# Patient Record
Sex: MALE | Race: WHITE | Employment: FULL TIME | URBAN - NONMETROPOLITAN AREA
[De-identification: names, ages, dates, MRNs, and addresses within clinical notes are randomized per-mention and may not be internally consistent; named-entity substitution may affect disease eponyms.]

---

## 2019-05-18 ENCOUNTER — OFFICE VISIT (OUTPATIENT)
Dept: FAMILY MEDICINE CLINIC | Age: 54
End: 2019-05-18
Payer: COMMERCIAL

## 2019-05-18 VITALS
HEART RATE: 84 BPM | SYSTOLIC BLOOD PRESSURE: 142 MMHG | BODY MASS INDEX: 36.44 KG/M2 | WEIGHT: 269 LBS | HEIGHT: 72 IN | DIASTOLIC BLOOD PRESSURE: 82 MMHG | TEMPERATURE: 97.6 F | OXYGEN SATURATION: 97 %

## 2019-05-18 DIAGNOSIS — J20.9 ACUTE BRONCHITIS, UNSPECIFIED ORGANISM: Primary | ICD-10-CM

## 2019-05-18 PROCEDURE — 99213 OFFICE O/P EST LOW 20 MIN: CPT | Performed by: FAMILY MEDICINE

## 2019-05-18 PROCEDURE — 96372 THER/PROPH/DIAG INJ SC/IM: CPT | Performed by: FAMILY MEDICINE

## 2019-05-18 RX ORDER — AMOXICILLIN AND CLAVULANATE POTASSIUM 875; 125 MG/1; MG/1
1 TABLET, FILM COATED ORAL 2 TIMES DAILY
Qty: 14 TABLET | Refills: 0 | Status: SHIPPED | OUTPATIENT
Start: 2019-05-18 | End: 2019-05-25

## 2019-05-18 RX ORDER — METHYLPREDNISOLONE ACETATE 40 MG/ML
40 INJECTION, SUSPENSION INTRA-ARTICULAR; INTRALESIONAL; INTRAMUSCULAR; SOFT TISSUE ONCE
Status: COMPLETED | OUTPATIENT
Start: 2019-05-18 | End: 2019-05-18

## 2019-05-18 RX ORDER — GUAIFENESIN 600 MG/1
600 TABLET, EXTENDED RELEASE ORAL 2 TIMES DAILY
Qty: 30 TABLET | Refills: 0 | Status: SHIPPED | OUTPATIENT
Start: 2019-05-18 | End: 2019-06-02

## 2019-05-18 RX ADMIN — METHYLPREDNISOLONE ACETATE 40 MG: 40 INJECTION, SUSPENSION INTRA-ARTICULAR; INTRALESIONAL; INTRAMUSCULAR; SOFT TISSUE at 11:19

## 2019-05-18 NOTE — PROGRESS NOTES
OFFICE NOTE    19  Name: Efraín Santacruz  :1965   Sex:male   Age:53 y.o. SUBJECTIVE  Chief Complaint   Patient presents with    Generalized Body Aches     onset Thursday    Congestion    Cough       HPI   Sinus cough and cough, wheezing, cough still white, no fever      Review of Systems   H/o back surgery and chronic LBP      Current Outpatient Medications:     amoxicillin-clavulanate (AUGMENTIN) 875-125 MG per tablet, Take 1 tablet by mouth 2 times daily for 7 days, Disp: 14 tablet, Rfl: 0    guaiFENesin (MUCINEX) 600 MG extended release tablet, Take 1 tablet by mouth 2 times daily for 15 days, Disp: 30 tablet, Rfl: 0    Aspirin Buf,AjSgk-HmRpt-TyOfp, (BUFFERED ASPIRIN) 325 MG TABS, Take 650 mg by mouth daily as needed for Pain, Disp: , Rfl:     traMADol (ULTRAM) 50 MG tablet, Take 50 mg by mouth as needed for Pain, Disp: , Rfl:   No Known Allergies    Past Medical History:   Diagnosis Date    Chronic back pain 2014    Numbness     low back    Sprain of lumbar region 2014    Tingling in extremities     both legs     Past Surgical History:   Procedure Laterality Date    BACK SURGERY      BELPHAROPTOSIS REPAIR Right 14    Dr Peyman Gu right periorbital complex eye lid repair,     CHOLECYSTECTOMY      LUMBAR SPINE SURGERY       Family History   Problem Relation Age of Onset    Asthma Mother     COPD Mother     High Blood Pressure Father      Social History     Tobacco History     Smoking Status  Never Smoker    Smokeless Tobacco Use  Current User          Alcohol History     Alcohol Use Status  Yes Comment  Moderate          Drug Use     Drug Use Status  No          Sexual Activity     Sexually Active  Not Asked                OBJECTIVE  Vitals:    19 1023   BP: (!) 142/82   Pulse: 84   Temp: 97.6 °F (36.4 °C)   SpO2: 97%   Weight: 269 lb (122 kg)   Height: 6' (1.829 m)        Body mass index is 36.48 kg/m².     Patient is obese    No orders of the defined types were placed in this encounter. EXAM   Physical Exam   Constitutional: He appears well-developed and well-nourished. obese   Eyes: Conjunctivae are normal.   Neck: Normal range of motion. Neck supple. Cardiovascular: Normal rate. No murmur heard. Pulmonary/Chest: Effort normal.   Expiratory phase, rhonchi   Skin: No rash noted. Kitty Pratt was seen today for generalized body aches, congestion and cough. Diagnoses and all orders for this visit:    Acute bronchitis, unspecified organism  -     methylPREDNISolone acetate (DEPO-MEDROL) injection 40 mg  -     amoxicillin-clavulanate (AUGMENTIN) 875-125 MG per tablet; Take 1 tablet by mouth 2 times daily for 7 days  -     guaiFENesin (MUCINEX) 600 MG extended release tablet; Take 1 tablet by mouth 2 times daily for 15 days          Return if symptoms worsen or fail to improve.     Electronically signed by Spike Nichole MD on 5/18/19 at 11:02 AM

## 2020-06-03 ENCOUNTER — TELEPHONE (OUTPATIENT)
Dept: ORTHOPEDIC SURGERY | Age: 55
End: 2020-06-03

## 2022-06-10 ENCOUNTER — OFFICE VISIT (OUTPATIENT)
Dept: FAMILY MEDICINE CLINIC | Age: 57
End: 2022-06-10
Payer: MEDICAID

## 2022-06-10 VITALS
WEIGHT: 272 LBS | BODY MASS INDEX: 36.84 KG/M2 | HEART RATE: 78 BPM | OXYGEN SATURATION: 98 % | RESPIRATION RATE: 16 BRPM | HEIGHT: 72 IN | TEMPERATURE: 97.1 F

## 2022-06-10 DIAGNOSIS — H61.21 IMPACTED CERUMEN OF RIGHT EAR: ICD-10-CM

## 2022-06-10 DIAGNOSIS — H60.501 ACUTE OTITIS EXTERNA OF RIGHT EAR, UNSPECIFIED TYPE: Primary | ICD-10-CM

## 2022-06-10 PROCEDURE — 99213 OFFICE O/P EST LOW 20 MIN: CPT | Performed by: STUDENT IN AN ORGANIZED HEALTH CARE EDUCATION/TRAINING PROGRAM

## 2022-06-10 RX ORDER — CIPROFLOXACIN AND DEXAMETHASONE 3; 1 MG/ML; MG/ML
4 SUSPENSION/ DROPS AURICULAR (OTIC) 2 TIMES DAILY
Qty: 7.5 ML | Refills: 0 | Status: SHIPPED | OUTPATIENT
Start: 2022-06-10 | End: 2022-06-17

## 2022-06-10 ASSESSMENT — ENCOUNTER SYMPTOMS
COUGH: 0
RHINORRHEA: 0
VOMITING: 0
SHORTNESS OF BREATH: 0
ABDOMINAL PAIN: 0
NAUSEA: 0

## 2022-06-10 NOTE — PROGRESS NOTES
Brody Ware (:  1965) is a 49 y.o. male,Established patient, here for evaluation of the following chief complaint(s):  Otalgia (right, onset last night)         ASSESSMENT/PLAN:  1. Acute otitis externa of right ear, unspecified type  -     ciprofloxacin-dexamethasone (CIPRODEX) 0.3-0.1 % otic suspension; Place 4 drops into the right ear 2 times daily for 7 days, Disp-7.5 mL, R-0Normal  -     [facility] - Mary Newman MD, Otolaryngology, [facility]  2. Impacted cerumen of right ear  -     ciprofloxacin-dexamethasone (CIPRODEX) 0.3-0.1 % otic suspension; Place 4 drops into the right ear 2 times daily for 7 days, Disp-7.5 mL, R-0Normal  Otitis externa likely on exam. Removed a significant amount of cerumen + debris with a curette which did not relieve they patients hearing issues. It is difficult to visualize his membrane secondary to swelling and erythema. He also reports drainage. Given his hearing difficulty in the ear I would like him to follow up with ENT    Return if symptoms worsen or fail to improve. Subjective   SUBJECTIVE/OBJECTIVE:  R ear pain  -has been going on for a few months with difficulty hearing  -this morning has had some issues with pain and drainage  -Has had multiple surgeries on that ear when he was younger  -No fever      Review of Systems   Constitutional: Negative for chills and fever. HENT: Positive for ear pain and hearing loss. Negative for congestion and rhinorrhea. Respiratory: Negative for cough and shortness of breath. Cardiovascular: Negative for chest pain and leg swelling. Gastrointestinal: Negative for abdominal pain, nausea and vomiting. Genitourinary: Negative for dysuria and hematuria. Musculoskeletal: Negative for arthralgias and myalgias. Skin: Negative for rash and wound. Neurological: Negative for dizziness and light-headedness. Objective   Physical Exam  Vitals reviewed. Constitutional:       General: He is not in acute distress.   HENT: Head: Normocephalic and atraumatic. Right Ear: Decreased hearing noted. Swelling present. There is impacted cerumen. No mastoid tenderness. Tympanic membrane is erythematous. Left Ear: Tympanic membrane normal.   Eyes:      Extraocular Movements: Extraocular movements intact. Conjunctiva/sclera: Conjunctivae normal.   Cardiovascular:      Rate and Rhythm: Normal rate and regular rhythm. Pulmonary:      Effort: Pulmonary effort is normal.      Breath sounds: Normal breath sounds. No wheezing. Abdominal:      General: Abdomen is flat. There is no distension. Musculoskeletal:         General: No tenderness or deformity. Neurological:      General: No focal deficit present. Mental Status: He is alert and oriented to person, place, and time. An electronic signature was used to authenticate this note.     --Tea Lee MD

## 2022-07-13 ENCOUNTER — TELEPHONE (OUTPATIENT)
Dept: AUDIOLOGY | Age: 57
End: 2022-07-13

## 2022-07-13 NOTE — TELEPHONE ENCOUNTER
LVM for patient to call back to schedule hearing eval. Dr. Benitez Poles referral.   Electronically signed by Miko Pozo on 7/13/2022 at 3:31 PM

## 2022-08-16 ENCOUNTER — FOLLOWUP TELEPHONE ENCOUNTER (OUTPATIENT)
Dept: AUDIOLOGY | Age: 57
End: 2022-08-16

## 2022-10-10 ENCOUNTER — TELEPHONE (OUTPATIENT)
Dept: AUDIOLOGY | Age: 57
End: 2022-10-10

## 2022-10-10 NOTE — TELEPHONE ENCOUNTER
Called patient - 3rd call    His name was on  so left detailed message. [facility] calling to see if still need a hearing test ordered in June. Asked to call if wants to get on schedule and this would be our last call.

## 2022-11-04 ENCOUNTER — OFFICE VISIT (OUTPATIENT)
Dept: FAMILY MEDICINE CLINIC | Age: 57
End: 2022-11-04
Payer: COMMERCIAL

## 2022-11-04 VITALS
SYSTOLIC BLOOD PRESSURE: 130 MMHG | BODY MASS INDEX: 35.73 KG/M2 | HEART RATE: 84 BPM | DIASTOLIC BLOOD PRESSURE: 90 MMHG | OXYGEN SATURATION: 96 % | WEIGHT: 263.8 LBS | TEMPERATURE: 97.1 F | HEIGHT: 72 IN

## 2022-11-04 DIAGNOSIS — R03.0 ELEVATED BLOOD PRESSURE READING IN OFFICE WITHOUT DIAGNOSIS OF HYPERTENSION: ICD-10-CM

## 2022-11-04 DIAGNOSIS — Z23 NEED FOR IMMUNIZATION AGAINST INFLUENZA: ICD-10-CM

## 2022-11-04 DIAGNOSIS — Z13.6 ENCOUNTER FOR LIPID SCREENING FOR CARDIOVASCULAR DISEASE: ICD-10-CM

## 2022-11-04 DIAGNOSIS — Z13.220 ENCOUNTER FOR LIPID SCREENING FOR CARDIOVASCULAR DISEASE: ICD-10-CM

## 2022-11-04 DIAGNOSIS — Z12.11 ENCOUNTER FOR SCREENING FOR MALIGNANT NEOPLASM OF COLON: ICD-10-CM

## 2022-11-04 DIAGNOSIS — Z00.01 ENCOUNTER FOR WELL ADULT EXAM WITH ABNORMAL FINDINGS: Primary | ICD-10-CM

## 2022-11-04 LAB
ALBUMIN SERPL-MCNC: 4.2 G/DL (ref 3.5–5.2)
ALP BLD-CCNC: 77 U/L (ref 40–129)
ALT SERPL-CCNC: 32 U/L (ref 0–40)
ANION GAP SERPL CALCULATED.3IONS-SCNC: 15 MMOL/L (ref 7–16)
AST SERPL-CCNC: 25 U/L (ref 0–39)
BASOPHILS ABSOLUTE: 0.07 E9/L (ref 0–0.2)
BASOPHILS RELATIVE PERCENT: 0.9 % (ref 0–2)
BILIRUB SERPL-MCNC: 0.6 MG/DL (ref 0–1.2)
BUN BLDV-MCNC: 18 MG/DL (ref 6–20)
CALCIUM SERPL-MCNC: 9.2 MG/DL (ref 8.6–10.2)
CHLORIDE BLD-SCNC: 104 MMOL/L (ref 98–107)
CHOLESTEROL, TOTAL: 187 MG/DL (ref 0–199)
CO2: 20 MMOL/L (ref 22–29)
CREAT SERPL-MCNC: 1.2 MG/DL (ref 0.7–1.2)
EOSINOPHILS ABSOLUTE: 0.24 E9/L (ref 0.05–0.5)
EOSINOPHILS RELATIVE PERCENT: 3.1 % (ref 0–6)
GFR SERPL CREATININE-BSD FRML MDRD: >60 ML/MIN/1.73
GLUCOSE BLD-MCNC: 88 MG/DL (ref 74–99)
HCT VFR BLD CALC: 44.7 % (ref 37–54)
HDLC SERPL-MCNC: 56 MG/DL
HEMOGLOBIN: 15.3 G/DL (ref 12.5–16.5)
IMMATURE GRANULOCYTES #: 0.03 E9/L
IMMATURE GRANULOCYTES %: 0.4 % (ref 0–5)
LDL CHOLESTEROL CALCULATED: 108 MG/DL (ref 0–99)
LYMPHOCYTES ABSOLUTE: 2.08 E9/L (ref 1.5–4)
LYMPHOCYTES RELATIVE PERCENT: 26.5 % (ref 20–42)
MCH RBC QN AUTO: 32.6 PG (ref 26–35)
MCHC RBC AUTO-ENTMCNC: 34.2 % (ref 32–34.5)
MCV RBC AUTO: 95.3 FL (ref 80–99.9)
MONOCYTES ABSOLUTE: 0.85 E9/L (ref 0.1–0.95)
MONOCYTES RELATIVE PERCENT: 10.8 % (ref 2–12)
NEUTROPHILS ABSOLUTE: 4.57 E9/L (ref 1.8–7.3)
NEUTROPHILS RELATIVE PERCENT: 58.3 % (ref 43–80)
PDW BLD-RTO: 13.9 FL (ref 11.5–15)
PLATELET # BLD: 309 E9/L (ref 130–450)
PMV BLD AUTO: 10.4 FL (ref 7–12)
POTASSIUM SERPL-SCNC: 4.4 MMOL/L (ref 3.5–5)
RBC # BLD: 4.69 E12/L (ref 3.8–5.8)
SODIUM BLD-SCNC: 139 MMOL/L (ref 132–146)
TOTAL PROTEIN: 7.1 G/DL (ref 6.4–8.3)
TRIGL SERPL-MCNC: 113 MG/DL (ref 0–149)
VLDLC SERPL CALC-MCNC: 23 MG/DL
WBC # BLD: 7.8 E9/L (ref 4.5–11.5)

## 2022-11-04 PROCEDURE — 90471 IMMUNIZATION ADMIN: CPT | Performed by: STUDENT IN AN ORGANIZED HEALTH CARE EDUCATION/TRAINING PROGRAM

## 2022-11-04 PROCEDURE — 99396 PREV VISIT EST AGE 40-64: CPT | Performed by: STUDENT IN AN ORGANIZED HEALTH CARE EDUCATION/TRAINING PROGRAM

## 2022-11-04 PROCEDURE — 90674 CCIIV4 VAC NO PRSV 0.5 ML IM: CPT | Performed by: STUDENT IN AN ORGANIZED HEALTH CARE EDUCATION/TRAINING PROGRAM

## 2022-11-04 SDOH — ECONOMIC STABILITY: FOOD INSECURITY: WITHIN THE PAST 12 MONTHS, THE FOOD YOU BOUGHT JUST DIDN'T LAST AND YOU DIDN'T HAVE MONEY TO GET MORE.: NEVER TRUE

## 2022-11-04 SDOH — ECONOMIC STABILITY: FOOD INSECURITY: WITHIN THE PAST 12 MONTHS, YOU WORRIED THAT YOUR FOOD WOULD RUN OUT BEFORE YOU GOT MONEY TO BUY MORE.: NEVER TRUE

## 2022-11-04 ASSESSMENT — ENCOUNTER SYMPTOMS
SHORTNESS OF BREATH: 0
ABDOMINAL PAIN: 0
RHINORRHEA: 0
VOMITING: 0
NAUSEA: 0
COUGH: 0

## 2022-11-04 ASSESSMENT — PATIENT HEALTH QUESTIONNAIRE - PHQ9
SUM OF ALL RESPONSES TO PHQ QUESTIONS 1-9: 0
SUM OF ALL RESPONSES TO PHQ QUESTIONS 1-9: 0
1. LITTLE INTEREST OR PLEASURE IN DOING THINGS: 0
2. FEELING DOWN, DEPRESSED OR HOPELESS: 0
SUM OF ALL RESPONSES TO PHQ QUESTIONS 1-9: 0
SUM OF ALL RESPONSES TO PHQ QUESTIONS 1-9: 0
SUM OF ALL RESPONSES TO PHQ9 QUESTIONS 1 & 2: 0

## 2022-11-04 ASSESSMENT — SOCIAL DETERMINANTS OF HEALTH (SDOH): HOW HARD IS IT FOR YOU TO PAY FOR THE VERY BASICS LIKE FOOD, HOUSING, MEDICAL CARE, AND HEATING?: NOT HARD AT ALL

## 2022-11-04 NOTE — PROGRESS NOTES
[facility]  Office Note  Dr. oB Peraza      Patient:  Traci Neil 56 y.o. male     Date of Service: 11/4/22      Chief complaint:   Chief Complaint   Patient presents with    Establish Care    Other     Needs work px form filled out. Needs labs. No colon ca screen. Flu Vaccine         History of Present Illness   The patient is a 56 y.o. male  presented to the clinic with complaints as above. Overall feels well. No acute complaints. Here to establish and needs work physical paperwork filled out. Not on any daily meds  No reported history of MI or CAD  Previous records were brought to the office and I will be reviewing  He is agreeable to colon cancer screening with cologuard, as well as a flu shot    Elevated BP  -reports he was diagnosed with white coat HTN in the past  -came down somewhat on repeat  -not currently treated  Denies chest pain, shortness of breath, leg swelling, headache, vision changes and orthopnea    BP Readings from Last 3 Encounters:   11/04/22 (!) 130/90   05/18/19 (!) 142/82   05/18/16 (!) 138/100       Past Medical History:      Diagnosis Date    Chronic back pain 12/12/2014    Numbness     low back    Sprain of lumbar region 12/12/2014    Tingling in extremities     both legs       PastSurgical History:        Procedure Laterality Date    BACK SURGERY      BELPHAROPTOSIS REPAIR Right 8/31/14    Dr Femi Vasquez right periorbital complex eye lid repair,     CHOLECYSTECTOMY      LUMBAR SPINE SURGERY         Allergies:    Patient has no known allergies.     Social History:   Social History     Socioeconomic History    Marital status: Legally      Spouse name: Not on file    Number of children: Not on file    Years of education: Not on file    Highest education level: Not on file   Occupational History    Not on file   Tobacco Use    Smoking status: Never    Smokeless tobacco: Current   Substance and Sexual Activity    Alcohol use: Yes     Comment: Moderate    Drug use: No    Sexual activity: Not on file   Other Topics Concern    Not on file   Social History Narrative    Not on file     Social Determinants of Health     Financial Resource Strain: Low Risk     Difficulty of Paying Living Expenses: Not hard at all   Food Insecurity: No Food Insecurity    Worried About Running Out of Food in the Last Year: Never true    Ran Out of Food in the Last Year: Never true   Transportation Needs: Not on file   Physical Activity: Not on file   Stress: Not on file   Social Connections: Not on file   Intimate Partner Violence: Not on file   Housing Stability: Not on file        Family History:       Problem Relation Age of Onset    Asthma Mother     COPD Mother     High Blood Pressure Father        Review of Systems:   Review of Systems   Constitutional:  Negative for chills and fever. HENT:  Negative for congestion and rhinorrhea. Respiratory:  Negative for cough and shortness of breath. Cardiovascular:  Negative for chest pain and leg swelling. Gastrointestinal:  Negative for abdominal pain, nausea and vomiting. Genitourinary:  Negative for dysuria and hematuria. Musculoskeletal:  Negative for arthralgias and myalgias. Skin:  Negative for rash and wound. Neurological:  Negative for dizziness and light-headedness. Physical Exam   Vitals: BP (!) 130/90   Pulse 84   Temp 97.1 °F (36.2 °C)   Ht 6' (1.829 m)   Wt 263 lb 12.8 oz (119.7 kg)   SpO2 96%   BMI 35.78 kg/m²   Physical Exam    General:  Awake, alert, oriented X 3. Well developed, well nourished, well groomed. No apparent distress. HEENT:  Normocephalic, atraumatic. No scleral icterus. No conjunctival injection. No nasal discharge. Neck:  Supple  Heart:  RRR, no murmurs, gallops, or rubs  Lungs:  CTA bilaterally, bilat symmetrical expansion, no wheeze, rales, or rhonchi  Abdomen:   Bowel sounds present, soft, nontender, no masses, no organomegaly, no peritoneal signs  Extremities:  No clubbing, cyanosis, or edema  Skin:  Warm and dry, no open lesions or rash  Neuro:  Cranial nerves 2-12 intact, no focal deficits  Breast: deferred  Rectal: deferred  Genitalia:  deferred     Assessment and Plan   Overall doing well. I am concerned about BP. He states he can have BP checked at his pharmacy-it is [facility] to the [facility] he [facility] so he is going to call me with a few readings over the next week or two. Will have follow up in 3 months to check BP  Otherwise doing well. Will get lab work requested from employer, send cologuard. 1. Encounter for well adult exam with abnormal findings    - LIPID PANEL; Future  - CBC with Auto Differential; Future  - Comprehensive Metabolic Panel; Future    2. Elevated blood pressure reading in office without diagnosis of hypertension    - LIPID PANEL; Future  - CBC with Auto Differential; Future  - Comprehensive Metabolic Panel; Future    3. Need for immunization against influenza    - Influenza, FLUCELVAX, (age 10 mo+), IM, Preservative Free, 0.5 mL    4. Encounter for screening for malignant neoplasm of colon    - Fecal DNA Colorectal cancer screening (Cologuard)    5. Encounter for lipid screening for cardiovascular disease    - LIPID PANEL; Future      Counseled regarding above diagnosis, including possible risks and complications,  especially if left uncontrolled. Counseled regarding the possible side effects, risks, benefits and alternatives to treatment;patient and/or guardian verbalizes understanding, agrees, feels comfortable with and wishes to proceed with above treatment plan. Call or go to ED immediately if symptoms worsen or persist. Advised patient to call with any new medication issues, and, as applicable, read all Rx info from pharmacy to assure aware of all possible risks and side effects of medicationbefore taking. Patient and/or guardian given opportunity to ask questions/raise concerns.   The patient verbalized comfort and understanding ofinstructions. Return to Office: Return in about 1 year (around 11/4/2023) for well visit. Medication List:    No current outpatient medications on file. No current facility-administered medications for this visit. Ciera Ruiz MD         This document may have been prepared at least partially through the use of voice recognition software. Although effort is taken to assure the accuracy ofthis document, it is possible that grammatical, syntax, or spelling errors may occur.

## 2022-11-07 NOTE — RESULT ENCOUNTER NOTE
Labs look good. Cholesterol is slightly elevated, does not need treatment other than diet and exercise currently. Let us know when get gets some BP readings outside of the office.     The 10-year ASCVD risk score (Karl BOYD, et al., 2019) is: 4.8%    Values used to calculate the score:      Age: 56 years      Sex: Male      Is Non- : No      Diabetic: No      Tobacco smoker: No      Systolic Blood Pressure: 643 mmHg      Is BP treated: No      HDL Cholesterol: 56 mg/dL      Total Cholesterol: 187 mg/dL

## 2023-11-06 ENCOUNTER — TELEPHONE (OUTPATIENT)
Dept: FAMILY MEDICINE CLINIC | Age: 58
End: 2023-11-06

## 2023-11-06 NOTE — TELEPHONE ENCOUNTER
----- Message from Alysha Natalia sent at 11/6/2023  8:18 AM EST -----  Subject: Appointment Request    Reason for Call: Established Patient Appointment needed: Routine Physical   Exam    QUESTIONS    Reason for appointment request? No appointments available during search     Additional Information for Provider? please call patient with open   appointments for AWV needs to be scheduled after Dec 1st. Anytime would be   good.   ---------------------------------------------------------------------------  --------------  Gabriel VILLAVICENCIO  [phone]; OK to leave message on voicemail  ---------------------------------------------------------------------------  --------------  SCRIPT ANSWERS

## 2023-11-06 NOTE — TELEPHONE ENCOUNTER
Called Sandra Hanna to get his AWV scheduled at his request, LVM for Sandra Hanna to call office back and get scheduled.  PA